# Patient Record
Sex: FEMALE | Race: WHITE | NOT HISPANIC OR LATINO | Employment: UNEMPLOYED | URBAN - NONMETROPOLITAN AREA
[De-identification: names, ages, dates, MRNs, and addresses within clinical notes are randomized per-mention and may not be internally consistent; named-entity substitution may affect disease eponyms.]

---

## 2022-11-04 ENCOUNTER — OFFICE VISIT (OUTPATIENT)
Dept: GASTROENTEROLOGY | Facility: CLINIC | Age: 57
End: 2022-11-04

## 2022-11-04 VITALS — WEIGHT: 124.4 LBS | HEIGHT: 62 IN | BODY MASS INDEX: 22.89 KG/M2

## 2022-11-04 DIAGNOSIS — Z86.010 HISTORY OF COLON POLYPS: ICD-10-CM

## 2022-11-04 DIAGNOSIS — K59.04 CHRONIC IDIOPATHIC CONSTIPATION: ICD-10-CM

## 2022-11-04 DIAGNOSIS — K62.5 BRBPR (BRIGHT RED BLOOD PER RECTUM): Primary | ICD-10-CM

## 2022-11-04 PROCEDURE — 99204 OFFICE O/P NEW MOD 45 MIN: CPT | Performed by: PHYSICIAN ASSISTANT

## 2022-11-04 RX ORDER — POLYETHYLENE GLYCOL 3350 17 G/17G
510 POWDER, FOR SOLUTION ORAL TAKE AS DIRECTED
Qty: 510 G | Refills: 0 | Status: SHIPPED | OUTPATIENT
Start: 2022-11-04 | End: 2022-11-17 | Stop reason: HOSPADM

## 2022-11-04 RX ORDER — BISACODYL 5 MG/1
20 TABLET, DELAYED RELEASE ORAL ONCE
Qty: 4 TABLET | Refills: 0 | Status: SHIPPED | OUTPATIENT
Start: 2022-11-04 | End: 2022-11-04

## 2022-11-04 RX ORDER — RISEDRONATE SODIUM 150 MG/1
TABLET, FILM COATED ORAL
COMMUNITY
Start: 2022-10-29

## 2022-11-04 NOTE — PROGRESS NOTES
Chief Complaint   Patient presents with   • Rectal Bleeding       Yamileth Wiggins is a 57 y.o. female who presents to the office today for evaluation of Rectal Bleeding      HPI  This is a new patient who presents for evaluation of rectal bleeding and constipation.     The patient reports several episodes of rather significant blood per rectum resulting in with lower abdominal cramping. There is a family history of diverticulitis in her father and has questions regarding whether this is hereditary. She denies a family history of colon cancer, though she states her father has a benign gastric tumor and renal cancer resulting in a nephrectomy. Her most recent colonoscopy was 5 years ago, which identified 2 polyps. The patient reports that she is from [place] and since coming to [place] recently, she has not been eating in a healthy manner or taking very good care of herself. Whereas in [place], she typically eats fish, vegetables, and salads, she has been eating lots of meat and junk food. Her bowel movements have been somewhat hard recently, and she notes protrusion of a known hemorrhoid.     Review of Systems   Constitutional: Negative for fever.   HENT: Negative for trouble swallowing.    Eyes: Negative.    Respiratory: Negative.    Cardiovascular: Negative.    Gastrointestinal: Positive for abdominal pain, anal bleeding and blood in stool. Negative for abdominal distention, constipation, diarrhea, nausea and vomiting.   Endocrine: Negative.    Genitourinary: Negative.    Musculoskeletal: Negative.    Skin: Negative.    Allergic/Immunologic: Negative.    Neurological: Negative.    Hematological: Negative.    Psychiatric/Behavioral: Negative.        ACTIVE PROBLEMS:   Specialty Problems    None      PAST MEDICAL HISTORY:  Past Medical History:   Diagnosis Date   • Osteoporosis        SURGICAL HISTORY:  Past Surgical History:   Procedure Laterality Date   • FOOT SURGERY         FAMILY HISTORY:  Family History   Problem  "Relation Age of Onset   • Diverticulitis Father        SOCIAL HISTORY:  Social History     Tobacco Use   • Smoking status: Never   • Smokeless tobacco: Never   Substance Use Topics   • Alcohol use: Never       CURRENT MEDICATION:    Current Outpatient Medications:   •  polyethylene glycol (MIRALAX) 17 GM/SCOOP powder, Take 510 g by mouth Take As Directed. Place 15 cap fulls of powder in 32 oz of liquid of choice at 4:00 and 10:00 PM, Disp: 510 g, Rfl: 0  •  risedronate (ACTONEL) 150 MG tablet, , Disp: , Rfl:     ALLERGIES:  Sulfa antibiotics    VISIT VITALS:  Ht 157.5 cm (62\")   Wt 56.4 kg (124 lb 6.4 oz)   BMI 22.75 kg/m²   Physical Exam  Constitutional:       General: She is not in acute distress.     Appearance: Normal appearance. She is well-developed.   HENT:      Head: Normocephalic and atraumatic.   Eyes:      Pupils: Pupils are equal, round, and reactive to light.   Cardiovascular:      Rate and Rhythm: Normal rate and regular rhythm.      Heart sounds: Normal heart sounds.   Pulmonary:      Effort: Pulmonary effort is normal. No respiratory distress.      Breath sounds: Normal breath sounds. No wheezing, rhonchi or rales.   Abdominal:      General: Abdomen is flat. Bowel sounds are normal. There is no distension.      Palpations: Abdomen is soft. There is no mass.      Tenderness: There is no abdominal tenderness. There is no guarding or rebound.      Hernia: No hernia is present.   Musculoskeletal:         General: No swelling. Normal range of motion.      Cervical back: Normal range of motion and neck supple.      Right lower leg: No edema.      Left lower leg: No edema.   Skin:     General: Skin is warm and dry.   Neurological:      Mental Status: She is alert and oriented to person, place, and time.   Psychiatric:         Attention and Perception: Attention normal.         Mood and Affect: Mood normal.         Speech: Speech normal.         Behavior: Behavior normal. Behavior is cooperative.         " Thought Content: Thought content normal.         Assessment       Diagnosis Plan   1. BRBPR (bright red blood per rectum)  Case Request    Follow Anesthesia Guidelines / Protocol    Obtain Informed Consent    Case Request    bisacodyl (DULCOLAX) 5 MG EC tablet    polyethylene glycol (MIRALAX) 17 GM/SCOOP powder      2. History of colon polyps  bisacodyl (DULCOLAX) 5 MG EC tablet    polyethylene glycol (MIRALAX) 17 GM/SCOOP powder      3. Chronic idiopathic constipation  bisacodyl (DULCOLAX) 5 MG EC tablet    polyethylene glycol (MIRALAX) 17 GM/SCOOP powder        1. BRBPR (bright red blood per rectum)    - Colonoscopy ordered. Bowel prep instructions discussed and provided.   - bisacodyl (Dulcolax) 5 mg   - polyethylene glycol (MiraLAX) 17 gm/scoop powder    2. History of colon polyps    - Colonoscopy ordered. Bowel prep instructions discussed and provided.   - bisacodyl (Dulcolax) 5 mg EC tablet  - polyethylene glycol (MiraLAX) 17 gm/scoop powder    3. Chronic idiopathic constipation    - Colonoscopy ordered. Bowel prep instructions discussed and provided.   - bisacodyl (Dulcolax) 5 mg   - polyethylene glycol (MiraLAX) 17 gm/scoop powder      Return After procedure.               This document has been electronically signed by Sophia Mccullough PA-C  November 10, 2022 11:27 EST    Part of this note may be an electronic transcription/translation of spoken language to printed text using the Dragon Dictation System.

## 2022-11-04 NOTE — H&P (VIEW-ONLY)
Chief Complaint   Patient presents with   • Rectal Bleeding       Yamileth Wiggins is a 57 y.o. female who presents to the office today for evaluation of Rectal Bleeding      HPI  This is a new patient who presents for evaluation of rectal bleeding and constipation.     The patient reports several episodes of rather significant blood per rectum resulting in with lower abdominal cramping. There is a family history of diverticulitis in her father and has questions regarding whether this is hereditary. She denies a family history of colon cancer, though she states her father has a benign gastric tumor and renal cancer resulting in a nephrectomy. Her most recent colonoscopy was 5 years ago, which identified 2 polyps. The patient reports that she is from [place] and since coming to [place] recently, she has not been eating in a healthy manner or taking very good care of herself. Whereas in [place], she typically eats fish, vegetables, and salads, she has been eating lots of meat and junk food. Her bowel movements have been somewhat hard recently, and she notes protrusion of a known hemorrhoid.     Review of Systems   Constitutional: Negative for fever.   HENT: Negative for trouble swallowing.    Eyes: Negative.    Respiratory: Negative.    Cardiovascular: Negative.    Gastrointestinal: Positive for abdominal pain, anal bleeding and blood in stool. Negative for abdominal distention, constipation, diarrhea, nausea and vomiting.   Endocrine: Negative.    Genitourinary: Negative.    Musculoskeletal: Negative.    Skin: Negative.    Allergic/Immunologic: Negative.    Neurological: Negative.    Hematological: Negative.    Psychiatric/Behavioral: Negative.        ACTIVE PROBLEMS:   Specialty Problems    None      PAST MEDICAL HISTORY:  Past Medical History:   Diagnosis Date   • Osteoporosis        SURGICAL HISTORY:  Past Surgical History:   Procedure Laterality Date   • FOOT SURGERY         FAMILY HISTORY:  Family History   Problem  "Relation Age of Onset   • Diverticulitis Father        SOCIAL HISTORY:  Social History     Tobacco Use   • Smoking status: Never   • Smokeless tobacco: Never   Substance Use Topics   • Alcohol use: Never       CURRENT MEDICATION:    Current Outpatient Medications:   •  polyethylene glycol (MIRALAX) 17 GM/SCOOP powder, Take 510 g by mouth Take As Directed. Place 15 cap fulls of powder in 32 oz of liquid of choice at 4:00 and 10:00 PM, Disp: 510 g, Rfl: 0  •  risedronate (ACTONEL) 150 MG tablet, , Disp: , Rfl:     ALLERGIES:  Sulfa antibiotics    VISIT VITALS:  Ht 157.5 cm (62\")   Wt 56.4 kg (124 lb 6.4 oz)   BMI 22.75 kg/m²   Physical Exam  Constitutional:       General: She is not in acute distress.     Appearance: Normal appearance. She is well-developed.   HENT:      Head: Normocephalic and atraumatic.   Eyes:      Pupils: Pupils are equal, round, and reactive to light.   Cardiovascular:      Rate and Rhythm: Normal rate and regular rhythm.      Heart sounds: Normal heart sounds.   Pulmonary:      Effort: Pulmonary effort is normal. No respiratory distress.      Breath sounds: Normal breath sounds. No wheezing, rhonchi or rales.   Abdominal:      General: Abdomen is flat. Bowel sounds are normal. There is no distension.      Palpations: Abdomen is soft. There is no mass.      Tenderness: There is no abdominal tenderness. There is no guarding or rebound.      Hernia: No hernia is present.   Musculoskeletal:         General: No swelling. Normal range of motion.      Cervical back: Normal range of motion and neck supple.      Right lower leg: No edema.      Left lower leg: No edema.   Skin:     General: Skin is warm and dry.   Neurological:      Mental Status: She is alert and oriented to person, place, and time.   Psychiatric:         Attention and Perception: Attention normal.         Mood and Affect: Mood normal.         Speech: Speech normal.         Behavior: Behavior normal. Behavior is cooperative.         " Thought Content: Thought content normal.         Assessment       Diagnosis Plan   1. BRBPR (bright red blood per rectum)  Case Request    Follow Anesthesia Guidelines / Protocol    Obtain Informed Consent    Case Request    bisacodyl (DULCOLAX) 5 MG EC tablet    polyethylene glycol (MIRALAX) 17 GM/SCOOP powder      2. History of colon polyps  bisacodyl (DULCOLAX) 5 MG EC tablet    polyethylene glycol (MIRALAX) 17 GM/SCOOP powder      3. Chronic idiopathic constipation  bisacodyl (DULCOLAX) 5 MG EC tablet    polyethylene glycol (MIRALAX) 17 GM/SCOOP powder        1. BRBPR (bright red blood per rectum)    - Colonoscopy ordered. Bowel prep instructions discussed and provided.   - bisacodyl (Dulcolax) 5 mg   - polyethylene glycol (MiraLAX) 17 gm/scoop powder    2. History of colon polyps    - Colonoscopy ordered. Bowel prep instructions discussed and provided.   - bisacodyl (Dulcolax) 5 mg EC tablet  - polyethylene glycol (MiraLAX) 17 gm/scoop powder    3. Chronic idiopathic constipation    - Colonoscopy ordered. Bowel prep instructions discussed and provided.   - bisacodyl (Dulcolax) 5 mg   - polyethylene glycol (MiraLAX) 17 gm/scoop powder      Return After procedure.               This document has been electronically signed by Sophia Mccullough PA-C  November 10, 2022 11:27 EST    Part of this note may be an electronic transcription/translation of spoken language to printed text using the Dragon Dictation System.

## 2022-11-09 PROBLEM — K62.5 BRBPR (BRIGHT RED BLOOD PER RECTUM): Status: ACTIVE | Noted: 2022-11-09

## 2022-11-17 ENCOUNTER — HOSPITAL ENCOUNTER (OUTPATIENT)
Facility: HOSPITAL | Age: 57
Setting detail: HOSPITAL OUTPATIENT SURGERY
Discharge: HOME | End: 2022-11-17
Attending: INTERNAL MEDICINE | Admitting: INTERNAL MEDICINE

## 2022-11-17 ENCOUNTER — ANESTHESIA (OUTPATIENT)
Dept: PERIOP | Facility: HOSPITAL | Age: 57
End: 2022-11-17

## 2022-11-17 ENCOUNTER — ANESTHESIA EVENT (OUTPATIENT)
Dept: PERIOP | Facility: HOSPITAL | Age: 57
End: 2022-11-17

## 2022-11-17 VITALS
BODY MASS INDEX: 22.82 KG/M2 | HEART RATE: 63 BPM | HEIGHT: 62 IN | OXYGEN SATURATION: 100 % | SYSTOLIC BLOOD PRESSURE: 128 MMHG | TEMPERATURE: 97.1 F | RESPIRATION RATE: 20 BRPM | DIASTOLIC BLOOD PRESSURE: 72 MMHG | WEIGHT: 124 LBS

## 2022-11-17 DIAGNOSIS — K62.5 BRBPR (BRIGHT RED BLOOD PER RECTUM): ICD-10-CM

## 2022-11-17 PROCEDURE — 45378 DIAGNOSTIC COLONOSCOPY: CPT | Performed by: INTERNAL MEDICINE

## 2022-11-17 PROCEDURE — 25010000002 MIDAZOLAM PER 1MG: Performed by: NURSE ANESTHETIST, CERTIFIED REGISTERED

## 2022-11-17 PROCEDURE — 25010000002 PROPOFOL 200 MG/20ML EMULSION: Performed by: NURSE ANESTHETIST, CERTIFIED REGISTERED

## 2022-11-17 RX ORDER — KETOROLAC TROMETHAMINE 30 MG/ML
30 INJECTION, SOLUTION INTRAMUSCULAR; INTRAVENOUS EVERY 6 HOURS PRN
Status: DISCONTINUED | OUTPATIENT
Start: 2022-11-17 | End: 2022-11-17 | Stop reason: HOSPADM

## 2022-11-17 RX ORDER — SODIUM CHLORIDE, SODIUM LACTATE, POTASSIUM CHLORIDE, CALCIUM CHLORIDE 600; 310; 30; 20 MG/100ML; MG/100ML; MG/100ML; MG/100ML
125 INJECTION, SOLUTION INTRAVENOUS ONCE
Status: COMPLETED | OUTPATIENT
Start: 2022-11-17 | End: 2022-11-17

## 2022-11-17 RX ORDER — LIDOCAINE HYDROCHLORIDE 20 MG/ML
INJECTION, SOLUTION EPIDURAL; INFILTRATION; INTRACAUDAL; PERINEURAL AS NEEDED
Status: DISCONTINUED | OUTPATIENT
Start: 2022-11-17 | End: 2022-11-17 | Stop reason: SURG

## 2022-11-17 RX ORDER — FENTANYL CITRATE 50 UG/ML
50 INJECTION, SOLUTION INTRAMUSCULAR; INTRAVENOUS
Status: DISCONTINUED | OUTPATIENT
Start: 2022-11-17 | End: 2022-11-17 | Stop reason: HOSPADM

## 2022-11-17 RX ORDER — SODIUM CHLORIDE 0.9 % (FLUSH) 0.9 %
10 SYRINGE (ML) INJECTION AS NEEDED
Status: DISCONTINUED | OUTPATIENT
Start: 2022-11-17 | End: 2022-11-17 | Stop reason: HOSPADM

## 2022-11-17 RX ORDER — SODIUM CHLORIDE 0.9 % (FLUSH) 0.9 %
10 SYRINGE (ML) INJECTION EVERY 12 HOURS SCHEDULED
Status: DISCONTINUED | OUTPATIENT
Start: 2022-11-17 | End: 2022-11-17 | Stop reason: HOSPADM

## 2022-11-17 RX ORDER — IPRATROPIUM BROMIDE AND ALBUTEROL SULFATE 2.5; .5 MG/3ML; MG/3ML
3 SOLUTION RESPIRATORY (INHALATION) ONCE AS NEEDED
Status: DISCONTINUED | OUTPATIENT
Start: 2022-11-17 | End: 2022-11-17 | Stop reason: HOSPADM

## 2022-11-17 RX ORDER — SODIUM CHLORIDE, SODIUM LACTATE, POTASSIUM CHLORIDE, CALCIUM CHLORIDE 600; 310; 30; 20 MG/100ML; MG/100ML; MG/100ML; MG/100ML
100 INJECTION, SOLUTION INTRAVENOUS ONCE AS NEEDED
Status: DISCONTINUED | OUTPATIENT
Start: 2022-11-17 | End: 2022-11-17 | Stop reason: HOSPADM

## 2022-11-17 RX ORDER — MIDAZOLAM HYDROCHLORIDE 2 MG/2ML
INJECTION, SOLUTION INTRAMUSCULAR; INTRAVENOUS AS NEEDED
Status: DISCONTINUED | OUTPATIENT
Start: 2022-11-17 | End: 2022-11-17 | Stop reason: SURG

## 2022-11-17 RX ORDER — ONDANSETRON 2 MG/ML
4 INJECTION INTRAMUSCULAR; INTRAVENOUS AS NEEDED
Status: DISCONTINUED | OUTPATIENT
Start: 2022-11-17 | End: 2022-11-17 | Stop reason: HOSPADM

## 2022-11-17 RX ORDER — MIDAZOLAM HYDROCHLORIDE 1 MG/ML
1 INJECTION INTRAMUSCULAR; INTRAVENOUS
Status: DISCONTINUED | OUTPATIENT
Start: 2022-11-17 | End: 2022-11-17 | Stop reason: HOSPADM

## 2022-11-17 RX ORDER — MEPERIDINE HYDROCHLORIDE 25 MG/ML
12.5 INJECTION INTRAMUSCULAR; INTRAVENOUS; SUBCUTANEOUS
Status: DISCONTINUED | OUTPATIENT
Start: 2022-11-17 | End: 2022-11-17 | Stop reason: HOSPADM

## 2022-11-17 RX ORDER — OXYCODONE HYDROCHLORIDE AND ACETAMINOPHEN 5; 325 MG/1; MG/1
1 TABLET ORAL ONCE AS NEEDED
Status: DISCONTINUED | OUTPATIENT
Start: 2022-11-17 | End: 2022-11-17 | Stop reason: HOSPADM

## 2022-11-17 RX ORDER — PROPOFOL 10 MG/ML
INJECTION, EMULSION INTRAVENOUS AS NEEDED
Status: DISCONTINUED | OUTPATIENT
Start: 2022-11-17 | End: 2022-11-17 | Stop reason: SURG

## 2022-11-17 RX ADMIN — LIDOCAINE HYDROCHLORIDE 60 MG: 20 INJECTION, SOLUTION EPIDURAL; INFILTRATION; INTRACAUDAL; PERINEURAL at 11:40

## 2022-11-17 RX ADMIN — PROPOFOL 50 MG: 10 INJECTION, EMULSION INTRAVENOUS at 11:56

## 2022-11-17 RX ADMIN — PROPOFOL 100 MG: 10 INJECTION, EMULSION INTRAVENOUS at 11:40

## 2022-11-17 RX ADMIN — SODIUM CHLORIDE, POTASSIUM CHLORIDE, SODIUM LACTATE AND CALCIUM CHLORIDE: 600; 310; 30; 20 INJECTION, SOLUTION INTRAVENOUS at 11:37

## 2022-11-17 RX ADMIN — MIDAZOLAM HYDROCHLORIDE 2 MG: 1 INJECTION, SOLUTION INTRAMUSCULAR; INTRAVENOUS at 11:37

## 2022-11-17 RX ADMIN — PROPOFOL 50 MG: 10 INJECTION, EMULSION INTRAVENOUS at 11:45

## 2022-11-17 RX ADMIN — PROPOFOL 50 MG: 10 INJECTION, EMULSION INTRAVENOUS at 11:51

## 2022-11-17 NOTE — ANESTHESIA PREPROCEDURE EVALUATION
Anesthesia Evaluation     Patient summary reviewed and Nursing notes reviewed   no history of anesthetic complications:  NPO Solid Status: > 8 hours  NPO Liquid Status: > 8 hours           Airway   Mallampati: I  TM distance: >3 FB  Neck ROM: full  No difficulty expected  Dental - normal exam     Pulmonary - negative pulmonary ROS and normal exam   Cardiovascular - negative cardio ROS and normal exam        Neuro/Psych- negative ROS  GI/Hepatic/Renal/Endo    (+)  GI bleeding ,     Musculoskeletal (-) negative ROS    Abdominal  - normal exam    Bowel sounds: normal.   Substance History - negative use     OB/GYN negative ob/gyn ROS         Other        ROS/Med Hx Other: Osteoporosis                  Anesthesia Plan    ASA 2     general     intravenous induction     Anesthetic plan, risks, benefits, and alternatives have been provided, discussed and informed consent has been obtained with: patient.        CODE STATUS:

## 2022-11-17 NOTE — ANESTHESIA POSTPROCEDURE EVALUATION
Patient: Yamileth Wiggins    Procedure Summary     Date: 11/17/22 Room / Location: [facility] OR [address] [place] [address] COR OR    Anesthesia Start: 1137 Anesthesia Stop: 1202    Procedure: COLONOSCOPY Diagnosis:       BRBPR (bright red blood per rectum)      (BRBPR (bright red blood per rectum) [K62.5])    Surgeons: Digna Daniel MD Provider: Brandon Brink MD    Anesthesia Type: general ASA Status: 2          Anesthesia Type: general    Vitals  Vitals Value Taken Time   /72 11/17/22 1233   Temp 97.1 °F (36.2 °C) 11/17/22 1203   Pulse 63 11/17/22 1233   Resp 20 11/17/22 1233   SpO2 100 % 11/17/22 1233           Post Anesthesia Care and Evaluation    Patient location during evaluation: PHASE II  Patient participation: complete - patient participated  Level of consciousness: awake and alert  Pain score: 1  Pain management: adequate    Airway patency: patent  Anesthetic complications: No anesthetic complications  PONV Status: controlled  Cardiovascular status: acceptable  Respiratory status: acceptable  Hydration status: acceptable

## 2022-12-06 ENCOUNTER — APPOINTMENT (OUTPATIENT)
Dept: MAMMOGRAPHY | Facility: HOSPITAL | Age: 57
End: 2022-12-06

## 2022-12-20 ENCOUNTER — HOSPITAL ENCOUNTER (OUTPATIENT)
Dept: MAMMOGRAPHY | Facility: HOSPITAL | Age: 57
Discharge: HOME | End: 2022-12-20
Admitting: INTERNAL MEDICINE

## 2022-12-20 DIAGNOSIS — Z12.31 VISIT FOR SCREENING MAMMOGRAM: ICD-10-CM

## 2022-12-20 PROCEDURE — 77067 SCR MAMMO BI INCL CAD: CPT

## 2022-12-20 PROCEDURE — 77063 BREAST TOMOSYNTHESIS BI: CPT | Performed by: RADIOLOGY

## 2022-12-20 PROCEDURE — 77063 BREAST TOMOSYNTHESIS BI: CPT

## 2022-12-20 PROCEDURE — 77067 SCR MAMMO BI INCL CAD: CPT | Performed by: RADIOLOGY

## 2023-11-27 ENCOUNTER — TRANSCRIBE ORDERS (OUTPATIENT)
Dept: ADMINISTRATIVE | Facility: HOSPITAL | Age: 58
End: 2023-11-27
Payer: COMMERCIAL

## 2023-11-27 DIAGNOSIS — Z12.31 BREAST CANCER SCREENING BY MAMMOGRAM: Primary | ICD-10-CM

## 2024-02-09 ENCOUNTER — TRANSCRIBE ORDERS (OUTPATIENT)
Dept: ADMINISTRATIVE | Facility: HOSPITAL | Age: 59
End: 2024-02-09
Payer: COMMERCIAL

## 2024-02-09 DIAGNOSIS — Z12.31 SCREENING MAMMOGRAM FOR BREAST CANCER: Primary | ICD-10-CM

## 2024-02-15 ENCOUNTER — HOSPITAL ENCOUNTER (OUTPATIENT)
Dept: MAMMOGRAPHY | Facility: HOSPITAL | Age: 59
Discharge: HOME | End: 2024-02-15
Admitting: INTERNAL MEDICINE
Payer: COMMERCIAL

## 2024-02-15 DIAGNOSIS — Z12.31 SCREENING MAMMOGRAM FOR BREAST CANCER: ICD-10-CM

## 2024-02-15 PROCEDURE — 77067 SCR MAMMO BI INCL CAD: CPT | Performed by: RADIOLOGY

## 2024-02-15 PROCEDURE — 77067 SCR MAMMO BI INCL CAD: CPT

## 2024-02-15 PROCEDURE — 77063 BREAST TOMOSYNTHESIS BI: CPT

## 2024-02-15 PROCEDURE — 77063 BREAST TOMOSYNTHESIS BI: CPT | Performed by: RADIOLOGY

## 2025-02-26 ENCOUNTER — HOSPITAL ENCOUNTER (OUTPATIENT)
Dept: MAMMOGRAPHY | Facility: HOSPITAL | Age: 60
Discharge: HOME | End: 2025-02-26
Admitting: FAMILY MEDICINE
Payer: COMMERCIAL

## 2025-02-26 ENCOUNTER — OFFICE VISIT (OUTPATIENT)
Dept: FAMILY MEDICINE CLINIC | Facility: CLINIC | Age: 60
End: 2025-02-26
Payer: COMMERCIAL

## 2025-02-26 ENCOUNTER — PATIENT ROUNDING (BHMG ONLY) (OUTPATIENT)
Dept: FAMILY MEDICINE CLINIC | Facility: CLINIC | Age: 60
End: 2025-02-26
Payer: COMMERCIAL

## 2025-02-26 VITALS
WEIGHT: 128.2 LBS | DIASTOLIC BLOOD PRESSURE: 64 MMHG | HEART RATE: 64 BPM | TEMPERATURE: 97.1 F | SYSTOLIC BLOOD PRESSURE: 116 MMHG | BODY MASS INDEX: 23.59 KG/M2 | OXYGEN SATURATION: 99 % | HEIGHT: 62 IN | RESPIRATION RATE: 16 BRPM

## 2025-02-26 DIAGNOSIS — E04.1 THYROID NODULE: ICD-10-CM

## 2025-02-26 DIAGNOSIS — Z13.6 ENCOUNTER FOR LIPID SCREENING FOR CARDIOVASCULAR DISEASE: ICD-10-CM

## 2025-02-26 DIAGNOSIS — M81.0 OSTEOPOROSIS WITHOUT CURRENT PATHOLOGICAL FRACTURE, UNSPECIFIED OSTEOPOROSIS TYPE: Primary | ICD-10-CM

## 2025-02-26 DIAGNOSIS — D64.9 ANEMIA, UNSPECIFIED TYPE: ICD-10-CM

## 2025-02-26 DIAGNOSIS — Z13.220 ENCOUNTER FOR LIPID SCREENING FOR CARDIOVASCULAR DISEASE: ICD-10-CM

## 2025-02-26 DIAGNOSIS — Z12.31 VISIT FOR SCREENING MAMMOGRAM: ICD-10-CM

## 2025-02-26 DIAGNOSIS — L30.9 DERMATITIS: ICD-10-CM

## 2025-02-26 PROCEDURE — 77063 BREAST TOMOSYNTHESIS BI: CPT

## 2025-02-26 PROCEDURE — 99204 OFFICE O/P NEW MOD 45 MIN: CPT | Performed by: FAMILY MEDICINE

## 2025-02-26 PROCEDURE — 77067 SCR MAMMO BI INCL CAD: CPT

## 2025-02-26 RX ORDER — LANOLIN ALCOHOL/MO/W.PET/CERES
1000 CREAM (GRAM) TOPICAL DAILY
COMMUNITY

## 2025-02-26 RX ORDER — MULTIVIT WITH CALCIUM,IRON,MIN
1 TABLET ORAL DAILY
COMMUNITY

## 2025-02-26 RX ORDER — RISEDRONATE SODIUM 150 MG/1
150 TABLET, FILM COATED ORAL
Qty: 3 TABLET | Refills: 1 | Status: SHIPPED | OUTPATIENT
Start: 2025-02-26

## 2025-02-26 RX ORDER — ASCORBIC ACID 500 MG
500 TABLET ORAL DAILY
COMMUNITY

## 2025-02-26 RX ORDER — CLOTRIMAZOLE AND BETAMETHASONE DIPROPIONATE 10; .64 MG/G; MG/G
1 CREAM TOPICAL 2 TIMES DAILY
Qty: 45 G | Refills: 0 | Status: SHIPPED | OUTPATIENT
Start: 2025-02-26

## 2025-02-26 NOTE — PROGRESS NOTES
February 26, 2025    Hello, may I speak with Yamileth Wiggins?    My name is   Iona    I am  with [facility] [facility]  [facility]  [address] [place] [address] [phone].    Before we get started may I verify your date of birth? 1965 yes     I am calling to officially welcome you to our practice and ask about your recent visit. Is this a good time to talk? Yes     Tell me about your visit with us. What things went well?  the visit went great,really like this office       We're always looking for ways to make our patients' experiences even better. Do you have recommendations on ways we may improve?  no     Overall were you satisfied with your first visit to our practice? Yes        I appreciate you taking the time to speak with me today. Is there anything else I can do for you?   No     Thank you, and have a great day.

## 2025-03-07 ENCOUNTER — LAB (OUTPATIENT)
Dept: FAMILY MEDICINE CLINIC | Facility: CLINIC | Age: 60
End: 2025-03-07
Payer: COMMERCIAL

## 2025-03-07 DIAGNOSIS — D64.9 ANEMIA, UNSPECIFIED TYPE: ICD-10-CM

## 2025-03-07 DIAGNOSIS — Z13.220 ENCOUNTER FOR LIPID SCREENING FOR CARDIOVASCULAR DISEASE: ICD-10-CM

## 2025-03-07 DIAGNOSIS — M81.0 OSTEOPOROSIS WITHOUT CURRENT PATHOLOGICAL FRACTURE, UNSPECIFIED OSTEOPOROSIS TYPE: ICD-10-CM

## 2025-03-07 DIAGNOSIS — E04.1 THYROID NODULE: ICD-10-CM

## 2025-03-07 DIAGNOSIS — Z13.6 ENCOUNTER FOR LIPID SCREENING FOR CARDIOVASCULAR DISEASE: ICD-10-CM

## 2025-03-07 LAB
25(OH)D3 SERPL-MCNC: 31.4 NG/ML (ref 30–100)
ALBUMIN SERPL-MCNC: 4.2 G/DL (ref 3.5–5.2)
ALBUMIN/GLOB SERPL: 1.3 G/DL
ALP SERPL-CCNC: 86 U/L (ref 39–117)
ALT SERPL W P-5'-P-CCNC: 24 U/L (ref 1–33)
ANION GAP SERPL CALCULATED.3IONS-SCNC: 11 MMOL/L (ref 5–15)
AST SERPL-CCNC: 24 U/L (ref 1–32)
BASOPHILS # BLD AUTO: 0.03 10*3/MM3 (ref 0–0.2)
BASOPHILS NFR BLD AUTO: 0.6 % (ref 0–1.5)
BILIRUB SERPL-MCNC: 0.4 MG/DL (ref 0–1.2)
BUN SERPL-MCNC: 9 MG/DL (ref 8–23)
BUN/CREAT SERPL: 10.3 (ref 7–25)
CALCIUM SPEC-SCNC: 9.5 MG/DL (ref 8.6–10.5)
CHLORIDE SERPL-SCNC: 103 MMOL/L (ref 98–107)
CHOLEST SERPL-MCNC: 224 MG/DL (ref 0–200)
CO2 SERPL-SCNC: 26 MMOL/L (ref 22–29)
CREAT SERPL-MCNC: 0.87 MG/DL (ref 0.57–1)
DEPRECATED RDW RBC AUTO: 40.3 FL (ref 37–54)
EGFRCR SERPLBLD CKD-EPI 2021: 75.4 ML/MIN/1.73
EOSINOPHIL # BLD AUTO: 0.08 10*3/MM3 (ref 0–0.4)
EOSINOPHIL NFR BLD AUTO: 1.6 % (ref 0.3–6.2)
ERYTHROCYTE [DISTWIDTH] IN BLOOD BY AUTOMATED COUNT: 12 % (ref 12.3–15.4)
FERRITIN SERPL-MCNC: 78.2 NG/ML (ref 13–150)
GLOBULIN UR ELPH-MCNC: 3.3 GM/DL
GLUCOSE SERPL-MCNC: 102 MG/DL (ref 65–99)
HCT VFR BLD AUTO: 42 % (ref 34–46.6)
HDLC SERPL-MCNC: 82 MG/DL (ref 40–60)
HGB BLD-MCNC: 14.1 G/DL (ref 12–15.9)
IMM GRANULOCYTES # BLD AUTO: 0 10*3/MM3 (ref 0–0.05)
IMM GRANULOCYTES NFR BLD AUTO: 0 % (ref 0–0.5)
IRON 24H UR-MRATE: 56 MCG/DL (ref 37–145)
IRON SATN MFR SERPL: 17 % (ref 20–50)
LDLC SERPL CALC-MCNC: 126 MG/DL (ref 0–100)
LDLC/HDLC SERPL: 1.51 {RATIO}
LYMPHOCYTES # BLD AUTO: 1.89 10*3/MM3 (ref 0.7–3.1)
LYMPHOCYTES NFR BLD AUTO: 37.8 % (ref 19.6–45.3)
MCH RBC QN AUTO: 30.6 PG (ref 26.6–33)
MCHC RBC AUTO-ENTMCNC: 33.6 G/DL (ref 31.5–35.7)
MCV RBC AUTO: 91.1 FL (ref 79–97)
MONOCYTES # BLD AUTO: 0.66 10*3/MM3 (ref 0.1–0.9)
MONOCYTES NFR BLD AUTO: 13.2 % (ref 5–12)
NEUTROPHILS NFR BLD AUTO: 2.34 10*3/MM3 (ref 1.7–7)
NEUTROPHILS NFR BLD AUTO: 46.8 % (ref 42.7–76)
NRBC BLD AUTO-RTO: 0 /100 WBC (ref 0–0.2)
PLATELET # BLD AUTO: 257 10*3/MM3 (ref 140–450)
PMV BLD AUTO: 10.5 FL (ref 6–12)
POTASSIUM SERPL-SCNC: 4.1 MMOL/L (ref 3.5–5.2)
PROT SERPL-MCNC: 7.5 G/DL (ref 6–8.5)
RBC # BLD AUTO: 4.61 10*6/MM3 (ref 3.77–5.28)
SODIUM SERPL-SCNC: 140 MMOL/L (ref 136–145)
T4 FREE SERPL-MCNC: 1.04 NG/DL (ref 0.92–1.68)
TIBC SERPL-MCNC: 335 MCG/DL (ref 298–536)
TRANSFERRIN SERPL-MCNC: 225 MG/DL (ref 200–360)
TRIGL SERPL-MCNC: 91 MG/DL (ref 0–150)
TSH SERPL DL<=0.05 MIU/L-ACNC: 2.29 UIU/ML (ref 0.27–4.2)
VLDLC SERPL-MCNC: 16 MG/DL (ref 5–40)
WBC NRBC COR # BLD AUTO: 5 10*3/MM3 (ref 3.4–10.8)

## 2025-03-07 PROCEDURE — 84439 ASSAY OF FREE THYROXINE: CPT | Performed by: FAMILY MEDICINE

## 2025-03-07 PROCEDURE — 82728 ASSAY OF FERRITIN: CPT | Performed by: FAMILY MEDICINE

## 2025-03-07 PROCEDURE — 36415 COLL VENOUS BLD VENIPUNCTURE: CPT

## 2025-03-07 PROCEDURE — 83540 ASSAY OF IRON: CPT | Performed by: FAMILY MEDICINE

## 2025-03-07 PROCEDURE — 80061 LIPID PANEL: CPT | Performed by: FAMILY MEDICINE

## 2025-03-07 PROCEDURE — 82306 VITAMIN D 25 HYDROXY: CPT | Performed by: FAMILY MEDICINE

## 2025-03-07 PROCEDURE — 80050 GENERAL HEALTH PANEL: CPT | Performed by: FAMILY MEDICINE

## 2025-03-07 PROCEDURE — 84466 ASSAY OF TRANSFERRIN: CPT | Performed by: FAMILY MEDICINE

## 2025-03-10 ENCOUNTER — HOSPITAL ENCOUNTER (OUTPATIENT)
Dept: ULTRASOUND IMAGING | Facility: HOSPITAL | Age: 60
Discharge: HOME | End: 2025-03-10
Admitting: FAMILY MEDICINE
Payer: COMMERCIAL

## 2025-03-10 PROCEDURE — 76536 US EXAM OF HEAD AND NECK: CPT

## 2025-03-17 NOTE — PROGRESS NOTES
"Yamileth Rosalie     VITALS: Blood pressure 116/64, pulse 64, temperature 97.1 °F (36.2 °C), temperature source Temporal, resp. rate 16, height 157.5 cm (62\"), weight 58.2 kg (128 lb 3.2 oz), SpO2 99%.    Subjective  Chief Complaint  Establish Care, Osteoporosis, Thyroid Problem, Endometriosis, and Dense Breast Tissue    Subjective          History of Present Illness:    History of Present Illness  The patient is a 60-year-old female with medical conditions significant for osteoporosis and thyroid nodules who presents to the clinic for establishment of care.    She has recently relocated from [place] to [place] and is seeking a new primary care physician. She has been diagnosed with thyroid nodules, which have remained stable since her last ultrasound in 2023. She is not currently on any thyroid medication. She has no family history of thyroid cancer or autoimmune diseases such as Graves' disease, Hashimoto's thyroiditis, rheumatoid arthritis, lupus, Sjogren's syndrome, scleroderma, or Crohn's disease.    She underwent a mammogram earlier today. Her last Pap smear was conducted in 2023, with normal results. She has a history of one abnormal Pap smear, which was followed by a repeat test. She did not have her annual physical examination in 2024 due to her relocation from [place]. She has previously undergone a colonoscopy and is aware of the need to increase her dietary fiber intake. She has requested a copy of her immunization records. She received her shingles vaccine at [facility] and her pneumonia vaccine at [facility]. She contracted influenza this year, which was mild in severity.    She has a persistent cough that developed post-influenza infection, which also affects her .    She has a severe pruritic rash that necessitates the application of a dermatitis medication, which unfortunately has not provided relief.    She has a past medical history of endometriosis, which has resulted in infertility. She has undergone " several laparoscopic procedures. She was previously informed that her endometriosis would resolve post-menopause, but recent information suggests that the condition may persist due to ongoing bodily changes. She reports experiencing vaginal dryness.    She had a bone density scan last year but has not yet discussed the results with her primary care physician.    FAMILY HISTORY  She reports no family history of thyroid cancer, Graves' disease, Hashimoto's disease, rheumatoid arthritis, lupus, Sjogren's syndrome, scleroderma, or Crohn's disease. Her family history includes breast cancer, kidney cancer, high cholesterol, and high blood pressure. Her father had kidney cancer and prostate cancer. Her mother had high cholesterol, hypertension, diabetes, and  of a heart attack. All her brothers and sisters except one have high cholesterol.    IMMUNIZATIONS  She has received her shingles vaccine and pneumonia vaccine. She received influenza vaccine this year.    No complaints regarding medications.     The following portions of the patient's history were reviewed and updated as appropriate: allergies, current medications, past family history, past medical history, past social history, past surgical history and problem list.    Past Medical History  Past Medical History:   Diagnosis Date    Anemia     Disease of thyroid gland     NODULES    Endometriosis     H/O bladder infections     Osteoporosis        Surgical History  Past Surgical History:   Procedure Laterality Date    COLONOSCOPY      COLONOSCOPY N/A 2022    Procedure: COLONOSCOPY;  Surgeon: Digna Daniel MD;  Location: [facility];  Service: Gastroenterology;  Laterality: N/A;    DIAGNOSTIC LAPAROSCOPY      FOOT SURGERY Right     Vlaadez's neuroma , ,        Family History  Family History   Problem Relation Age of Onset    Diabetes Mother     Hyperlipidemia Mother     Hypertension Mother     Kidney disease Mother     Dementia Mother   "   Coronary artery disease Mother         MI (cause of death)    Prostate cancer Father         PROSTATE ISSUES    Diverticulitis Father     Anemia Father     Kidney cancer Father     Kidney cancer Sister         KIDNEY    Kidney disease Sister     Asthma Sister     Breast cancer Sister 64    Breast cancer Sister         BREAST    Hypertension Sister     Hypertension Sister     Breast cancer Sister         BREAST    Hypertension Brother     Gout Brother        Social History  Social History     Socioeconomic History    Marital status:    Tobacco Use    Smoking status: Never    Smokeless tobacco: Never   Vaping Use    Vaping status: Never Used   Substance and Sexual Activity    Alcohol use: Never    Drug use: Never    Sexual activity: Defer       Objective   Vital Signs:   /64 (BP Location: Right arm, Patient Position: Sitting, Cuff Size: Adult)   Pulse 64   Temp 97.1 °F (36.2 °C) (Temporal)   Resp 16   Ht 157.5 cm (62\")   Wt 58.2 kg (128 lb 3.2 oz)   SpO2 99%   BMI 23.45 kg/m²       Physical Exam     Physical Exam      Gen: Patient in NAD. Pleasant and answers appropriately. A&Ox3.    Skin: Warm and dry with normal turgor. No purpura or unusual pigmentation noted. Hair is normal in appearance and distribution.    HEENT: NC/AT. No lesions noted. Conjunctiva clear, sclera nonicteric. PERRL. EOMI without nystagmus or strabismus. Fundi appear benign. No hemorrhages or exudates of eyes. Auditory canals are patent bilaterally without lesions. TMs intact,  nonerythematous, nonbulging without lesions. Nasal mucosa pink, nonerythematous, and nonedematous. Frontal and maxillary sinuses are nontender. O/P nonerythematous and moist without exudate.    Neck: Supple without lymph nodes palpated. FROM. No carotid bruits appreciated bilaterally.    Lungs: CTA B/L without rales, rhonchi, crackles, or wheezes.    Heart: RRR. S1 and S2 normal. No S3 or S4. No MRGT.    Abd: Soft, nontender,nondistended. (+)BSx4 " quadrants.     Extrem: No CCE. Radial pulses 2+/4 and equal B/L. FROMx4. No bone, joint, or muscle tenderness noted.    Neuro: No focal motor/sensory deficits.    Procedures    Result Review :   The following data was reviewed by: Rosa Pierre MD on 02/26/2025:       Results  Imaging  Thyroid ultrasound from June 2023 showed six nodules.           Assessment and Plan      Yamileth Wiggins is a 60 y.o. here for establishment of care.    Diagnoses and all orders for this visit:    1. Osteoporosis without current pathological fracture, unspecified osteoporosis type (Primary)  -     risedronate (ACTONEL) 150 MG tablet; Take 1 tablet by mouth Every 30 (Thirty) Days.  Dispense: 3 tablet; Refill: 1  -     Vitamin D,25-Hydroxy; Future    2. Thyroid nodule  -     US Thyroid  -     CBC Auto Differential; Future  -     Comprehensive Metabolic Panel; Future  -     T4, Free; Future  -     TSH; Future    3. Encounter for lipid screening for cardiovascular disease  -     Lipid Panel; Future    4. Anemia, unspecified type  -     Ferritin; Future  -     Iron Profile; Future    5. Dermatitis  -     clotrimazole-betamethasone (LOTRISONE) 1-0.05 % cream; Apply 1 Application topically to the appropriate area as directed 2 (Two) Times a Day.  Dispense: 45 g; Refill: 0    Other orders  -     LABS SCANNED        Assessment & Plan  1. Thyroid nodules.  The patient has a history of multiple thyroid nodules, with the last ultrasound performed in June 2023 showing six nodules. A thyroid ultrasound will be ordered to monitor the nodules. Thyroid function tests, including TSH and T4, will be conducted every three months.    2. Osteoporosis.  The patient has a history of osteoporosis and is currently on medication. She mentioned having a bone density test last year but has not reviewed the results with her previous doctor. The results of the bone density test will be obtained from the imaging center in [place].    3. Endometriosis.  The patient  has a history of endometriosis and expressed concerns about its persistence post-menopause. She was informed that endometriosis is hormone-driven and should resolve post-menopause, but scar tissue may still cause issues. She was advised to report any abdominal pain or abnormal bleeding.    4. Rash.  The patient reported a persistent rash that itches severely. A stronger dermatitis medication will be prescribed to manage the symptoms.    5. Cough.  The patient has a lingering cough following a recent flu infection. She was advised to stay hydrated and gargle with salt water. A physical examination will be conducted to rule out pneumonia.    6. Health Maintenance.  The patient had a mammogram done this morning and was advised to continue annual mammograms due to her family history of breast cancer. A Pap smear will be scheduled before she turns 63, as her last one was done in 2023 and was normal. She was also advised to get a colonoscopy in March 2026. Fasting labs, including cholesterol levels, will be ordered. She was informed that she is up to date on her shingles and pneumonia vaccines but was advised to get the flu vaccine annually.    PROCEDURE  The patient has previously undergone several laparoscopic procedures for endometriosis. She has also undergone a colonoscopy in the past.      BMI is within normal parameters. No other follow-up for BMI required.          Patient or patient representative verbalized consent for the use of Ambient Listening during the visit with  Rosa Pierre MD for chart documentation. 3/16/2025  23:23 EDT        Follow Up   Return in about 3 months (around 5/26/2025), or (ROR Dexa scan - [facility] [phone]) LABS.  Findings and plans discussed with patient who verbalizes understanding and agreement. Will followup with patient once results are in. Patient was given instructions and counseling regarding her condition or for health maintenance advice. Please see specific  information pulled into the AVS if appropriate.       Rosa Pierre MD

## 2025-03-27 ENCOUNTER — OFFICE VISIT (OUTPATIENT)
Dept: FAMILY MEDICINE CLINIC | Facility: CLINIC | Age: 60
End: 2025-03-27
Payer: COMMERCIAL

## 2025-03-27 VITALS
DIASTOLIC BLOOD PRESSURE: 70 MMHG | SYSTOLIC BLOOD PRESSURE: 112 MMHG | HEART RATE: 66 BPM | BODY MASS INDEX: 24.03 KG/M2 | TEMPERATURE: 97.3 F | WEIGHT: 130.6 LBS | OXYGEN SATURATION: 99 % | HEIGHT: 62 IN

## 2025-03-27 DIAGNOSIS — E04.1 THYROID NODULE: Primary | ICD-10-CM

## 2025-03-27 PROCEDURE — 99214 OFFICE O/P EST MOD 30 MIN: CPT | Performed by: FAMILY MEDICINE

## 2025-03-27 NOTE — PROGRESS NOTES
"The 10-year ASCVD risk score (Tung MCBRIDE, et al., 2019) is: 2.7%    Values used to calculate the score:      Age: 60 years      Sex: Female      Is Non- : No      Diabetic: No      Tobacco smoker: No      Systolic Blood Pressure: 112 mmHg      Is BP treated: No      HDL Cholesterol: 82 mg/dL      Total Cholesterol: 224 mg/dL    Iris Rosalie     VITALS: Blood pressure 112/70, pulse 66, temperature 97.3 °F (36.3 °C), temperature source Temporal, height 157.5 cm (62\"), weight 59.2 kg (130 lb 9.6 oz), SpO2 99%.    Subjective  Chief Complaint  Abnormal Lab    Subjective          History of Present Illness:    History of Present Illness  The patient is a 60-year-old female with medical conditions significant for osteoporosis and a thyroid nodule, presenting to the clinic for a medical follow-up.    She has been experiencing persistent hypercholesterolemia, which she attributes to genetic factors. Despite maintaining a healthy diet, her cholesterol levels remain elevated. She has expressed interest in pharmacological intervention for her cholesterol management.    She has a history of thyroid nodules, for which she has previously consulted an endocrinologist in [place]. She has not been prescribed any thyroid medication to date.    She reports feeling fatigued, which she attributes to her late bedtime and early morning routine due to her responsibilities towards her two puppies. She compensates for this by taking afternoon naps.    She has a history of an abnormal Pap smear in her early 40s, following a miscarriage. She also reports having dense breasts and has been advised by her physicians to undergo an ultrasound. She has a family history of breast cancer, with her aunt having undergone a bilateral mastectomy at a young age and her sister recently diagnosed with breast cancer.    She has been experiencing intermittent skin issues, characterized by redness and itching. She has been using a cream " prescribed by her dermatologist, which she believes is not a steroid. She plans to bring the documentation from her dermatologist during her next visit.    FAMILY HISTORY  Her aunt lost both of her breasts due to breast cancer at a young age.  Her sister had breast cancer.    No complaints regarding medications.     The following portions of the patient's history were reviewed and updated as appropriate: allergies, current medications, past family history, past medical history, past social history, past surgical history and problem list.    Past Medical History  Past Medical History:   Diagnosis Date    Anemia     Disease of thyroid gland     NODULES    Endometriosis     H/O bladder infections     Osteoporosis        Surgical History  Past Surgical History:   Procedure Laterality Date    COLONOSCOPY      COLONOSCOPY N/A 11/17/2022    Procedure: COLONOSCOPY;  Surgeon: Digna Daniel MD;  Location: [facility];  Service: Gastroenterology;  Laterality: N/A;    DIAGNOSTIC LAPAROSCOPY      FOOT SURGERY Right     Valadez's neuroma 2022, 2023, 2024       Family History  Family History   Problem Relation Age of Onset    Diabetes Mother     Hyperlipidemia Mother     Hypertension Mother     Kidney disease Mother     Dementia Mother     Coronary artery disease Mother         MI (cause of death)    Prostate cancer Father         PROSTATE ISSUES    Diverticulitis Father     Anemia Father     Kidney cancer Father     Kidney cancer Sister         KIDNEY    Kidney disease Sister     Asthma Sister     Breast cancer Sister 64    Breast cancer Sister         BREAST    Hypertension Sister     Hypertension Sister     Breast cancer Sister         BREAST    Hypertension Brother     Gout Brother        Social History  Social History     Socioeconomic History    Marital status:    Tobacco Use    Smoking status: Never    Smokeless tobacco: Never   Vaping Use    Vaping status: Never Used   Substance and Sexual Activity     "Alcohol use: Never    Drug use: Never    Sexual activity: Defer       Objective   Vital Signs:   /70 (BP Location: Right arm, Patient Position: Sitting)   Pulse 66   Temp 97.3 °F (36.3 °C) (Temporal)   Ht 157.5 cm (62\")   Wt 59.2 kg (130 lb 9.6 oz)   SpO2 99%   BMI 23.89 kg/m²       Physical Exam     Physical Exam  Lungs were auscultated.    Gen: Patient in NAD. Pleasant and answers appropriately. A&Ox3.    Skin: Warm and dry with normal turgor. No purpura, rashes, or unusual pigmentation noted. Hair is normal in appearance and distribution.    HEENT: NC/AT. No lesions noted. Conjunctiva clear, sclera nonicteric. PERRL. EOMI without nystagmus or strabismus. Fundi appear benign. No hemorrhages or exudates of eyes. Auditory canals are patent bilaterally without lesions. TMs intact,  nonerythematous, nonbulging without lesions. Nasal mucosa pink, nonerythematous, and nonedematous. Frontal and maxillary sinuses are nontender. O/P nonerythematous and moist without exudate.    Neck: Supple without lymph nodes palpated. FROM. No carotid bruits appreciated bilaterally.    Lungs: CTA B/L without rales, rhonchi, crackles, or wheezes.    Heart: RRR. S1 and S2 normal. No S3 or S4. No MRGT.    Abd: Soft, nontender,nondistended. (+)BSx4 quadrants.     Extrem: No CCE. Radial pulses 2+/4 and equal B/L. FROMx4. No bone, joint, or muscle tenderness noted.    Neuro: No focal motor/sensory deficits.    Procedures    Result Review :   The following data was reviewed by: Rosa Pierre MD on 03/27/2025:       Results  Laboratory Studies  CBC, liver, kidneys, electrolytes, iron levels, thyroid function, and vitamin D levels are all within normal range. LDL cholesterol is 126. Glucose level is normal.           Assessment and Plan      Yamileth Wiggins is a 60 y.o. here for medical followup.    Assessment & Plan  1. Hypercholesterolemia.  Her LDL cholesterol level is 126, which is above the desired level of less than 100. " However, her cardiac risk score is 2.7%, which is considered low risk. She was advised to continue her current dietary habits and avoid processed foods. No medication was prescribed at this time. Her cholesterol levels will be monitored annually.    2. Thyroid nodules.  She has a history of thyroid nodules, and a biopsy was performed in 2021. The pathology results from the biopsy are not currently available. A thyroid uptake scan will be ordered to assess the activity of the nodules. If the nodules are active, further evaluation will be necessary. She will sign a release form to obtain the pathology results from [place] for comparison.    3. Fatigue.  She reports feeling tired, which may be related to her sleep patterns as she goes to bed late and wakes up early due to her puppies. She takes naps in the afternoon. No immediate intervention was recommended.    4. Health Maintenance.  She was advised to continue weight-bearing exercises for bone health. A Pap smear will be scheduled during her next visit in 3 months. She was also advised to schedule an appointment with a gynecologist for a comprehensive evaluation. Given her family history of breast cancer, a referral to [facility] for a breast ultrasound or MRI will be made next year.    5. Skin issues.  She reports intermittent skin issues characterized by redness and itching. She was advised to take a picture of the affected area during the next occurrence for further evaluation. She will bring her dermatologist's documents to the next visit.    Follow-up  The patient will follow up in 3 months.      BMI is within normal parameters. No other follow-up for BMI required.          Patient or patient representative verbalized consent for the use of Ambient Listening during the visit with  Rosa Pierre MD for chart documentation. 4/13/2025  23:22 EDT        Follow Up   Return in about 3 months (around 6/27/2025), or ([facility] for Ask if she wants to make the 3 month WWE or if she wants to do another appt for the WWE. Thanks..  Findings and plans discussed with patient who verbalizes understanding and agreement. Will followup with patient once results are in. Patient was given instructions and counseling regarding her condition or for health maintenance advice. Please see specific information pulled into the AVS if appropriate.       Rosa Pierre MD

## 2025-04-10 ENCOUNTER — HOSPITAL ENCOUNTER (OUTPATIENT)
Dept: NUCLEAR MEDICINE | Facility: HOSPITAL | Age: 60
Discharge: HOME | End: 2025-04-10
Payer: COMMERCIAL

## 2025-04-10 PROCEDURE — 78014 THYROID IMAGING W/BLOOD FLOW: CPT

## 2025-04-10 PROCEDURE — 34310000005 SODIUM IODIDE 7.4 MBQ CAPSULE: Performed by: FAMILY MEDICINE

## 2025-04-10 PROCEDURE — A9516 IODINE I-123 SOD IODIDE MIC: HCPCS | Performed by: FAMILY MEDICINE

## 2025-04-10 RX ORDER — SODIUM IODIDE I 123 200 UCI/1
1 CAPSULE, GELATIN COATED ORAL
Status: COMPLETED | OUTPATIENT
Start: 2025-04-10 | End: 2025-04-10

## 2025-04-10 RX ADMIN — Medication 1 CAPSULE: at 09:30

## 2025-04-11 ENCOUNTER — HOSPITAL ENCOUNTER (OUTPATIENT)
Dept: NUCLEAR MEDICINE | Facility: HOSPITAL | Age: 60
Discharge: HOME | End: 2025-04-11
Payer: COMMERCIAL

## 2025-04-30 ENCOUNTER — TELEPHONE (OUTPATIENT)
Dept: FAMILY MEDICINE CLINIC | Facility: CLINIC | Age: 60
End: 2025-04-30
Payer: COMMERCIAL

## 2025-04-30 NOTE — TELEPHONE ENCOUNTER
Is she calling about the ultrasound or the uptake scan? So the ultrasound shows a nodule, but we did the uptake scan to see how active the nodule is - it is in normal range so we just watch and wait. Will rescan the thyroid in a year.

## 2025-04-30 NOTE — TELEPHONE ENCOUNTER
Caller: Yamileth Wiggins    Relationship: Self    Best call back number: [phone]     Caller requesting test results: IRIS    What test was performed: US THYROID    When was the test performed: 02/26/25    Where was the test performed: [facility]    Additional notes: PATIENT IS CALLING TO DISCUSS THE RESULTS FOR HER ULTRASOUND TAKEN ON 02/26/25.    PLEASE CALL PATIENT ASAP TO DISCUSS THE RESULTS

## 2025-05-27 ENCOUNTER — TELEPHONE (OUTPATIENT)
Dept: FAMILY MEDICINE CLINIC | Facility: CLINIC | Age: 60
End: 2025-05-27
Payer: COMMERCIAL

## 2025-05-27 NOTE — TELEPHONE ENCOUNTER
Patient did not like the explanation that was provided to her by Yasmin Walker. She then spoke with Mary and Mary explained our workflow. Patient said that the information that was given to her did not match up with what we were telling her. Mary advised that she would get me to speak with her.   I got on the phone with the patient and the patient told me that she would come in person or for me to leave a message for Dr. Pierre because she was only going to speak with Dr. Pierre regarding this. She stated she did not even think it was legal for us to do a drug screen on her and she planned to call [employer]. I offered to review our office policy, workflow, and [facility] billing. Patient stated she didn't have time for that and hung up.

## 2025-05-27 NOTE — TELEPHONE ENCOUNTER
Caller: Yamileth Wiggins    Relationship: Self    Best call back number: [phone]     What is the best time to reach you: ANYTIME    Who are you requesting to speak with (clinical staff, provider,  specific staff member): CLINICAL    What was the call regarding: PATIENT STATES SHE WAS TOLD THAT IF SHE RECEIVED A BILL FOR THE DRUG SCREENING, SHE IS TO CALL AND SPEAK TO DR. HERRERA'S NURSE. PLEASE CALL BACK TO ADVISE

## 2025-06-03 ENCOUNTER — PATIENT MESSAGE (OUTPATIENT)
Dept: FAMILY MEDICINE CLINIC | Facility: CLINIC | Age: 60
End: 2025-06-03
Payer: COMMERCIAL

## 2025-06-30 ENCOUNTER — OFFICE VISIT (OUTPATIENT)
Dept: FAMILY MEDICINE CLINIC | Facility: CLINIC | Age: 60
End: 2025-06-30
Payer: COMMERCIAL

## 2025-06-30 ENCOUNTER — TELEPHONE (OUTPATIENT)
Dept: FAMILY MEDICINE CLINIC | Facility: CLINIC | Age: 60
End: 2025-06-30

## 2025-06-30 VITALS
TEMPERATURE: 96.6 F | HEIGHT: 62 IN | OXYGEN SATURATION: 97 % | HEART RATE: 68 BPM | DIASTOLIC BLOOD PRESSURE: 78 MMHG | BODY MASS INDEX: 23.22 KG/M2 | SYSTOLIC BLOOD PRESSURE: 120 MMHG | WEIGHT: 126.2 LBS

## 2025-06-30 DIAGNOSIS — L30.9 DERMATITIS: ICD-10-CM

## 2025-06-30 DIAGNOSIS — J30.2 SEASONAL ALLERGIC RHINITIS, UNSPECIFIED TRIGGER: ICD-10-CM

## 2025-06-30 DIAGNOSIS — Z01.419 ENCOUNTER FOR WELL WOMAN EXAM WITH ROUTINE GYNECOLOGICAL EXAM: Primary | ICD-10-CM

## 2025-06-30 DIAGNOSIS — M81.0 OSTEOPOROSIS WITHOUT CURRENT PATHOLOGICAL FRACTURE, UNSPECIFIED OSTEOPOROSIS TYPE: ICD-10-CM

## 2025-06-30 DIAGNOSIS — E04.1 THYROID NODULE: ICD-10-CM

## 2025-06-30 PROCEDURE — 99396 PREV VISIT EST AGE 40-64: CPT | Performed by: FAMILY MEDICINE

## 2025-06-30 RX ORDER — PREDNISOLONE ACETATE 10 MG/ML
SUSPENSION/ DROPS OPHTHALMIC
COMMUNITY
Start: 2025-04-25

## 2025-06-30 RX ORDER — FERROUS SULFATE 325(65) MG
325 TABLET ORAL
COMMUNITY

## 2025-06-30 RX ORDER — RISEDRONATE SODIUM 150 MG/1
150 TABLET, FILM COATED ORAL
Qty: 3 TABLET | Refills: 1 | Status: SHIPPED | OUTPATIENT
Start: 2025-06-30

## 2025-06-30 RX ORDER — TACROLIMUS 1 MG/G
1 OINTMENT TOPICAL 2 TIMES DAILY
Qty: 100 G | Refills: 0 | Status: SHIPPED | OUTPATIENT
Start: 2025-06-30

## 2025-06-30 RX ORDER — FLUTICASONE PROPIONATE 50 MCG
2 SPRAY, SUSPENSION (ML) NASAL DAILY
Qty: 16 G | Refills: 5 | Status: SHIPPED | OUTPATIENT
Start: 2025-06-30

## 2025-06-30 NOTE — TELEPHONE ENCOUNTER
----- Message from Rosa Pierre sent at 6/30/2025 12:15 PM EDT -----  Can you call [facility] [phone] and see what kind of vaginal cream she used to get? I'm thinking either a steroid cream or premarin or maybe even an antifungal? Thanks.

## 2025-06-30 NOTE — PROGRESS NOTES
" Vitals:/78 (BP Location: Right arm, Patient Position: Sitting, Cuff Size: Adult)   Pulse 68   Temp 96.6 °F (35.9 °C) (Temporal)   Ht 157.5 cm (62\")   Wt 57.2 kg (126 lb 3.2 oz)   SpO2 97%   BMI 23.08 kg/m²       Subjective     Chief Complaint: Well Woman's Exam    History of Present Illness:  HPI    Patient is a 60 y.o.  female with medical conditions significant for osteoporosis and thyroid nodule who presents to clinic secondary to her well woman's exam.    Patient has no acute Well woman concerns. Patient has dyspareunia.     Patient's last pelvic exam/pap smear was 2023. She has had an abnormal exam in [facility]; it was repeated and it was normal the second time around. She has not had any STDs. She is currently not sexually active.  She feels safe in her relationship. Patient is not on any birth control. Patient's LMP was at age 48, and she used to get her period every other month. Patient states that her periods lasted approximately 7 days with clotting and cramping.  Patient first had her menses at 11.  Patient has gone through menopause. Patient is .  She does have a history of endometriosis.  Patient does occasional breast exams; she has not noticed any breast changes. Benign mammogram history. Last one was 2025.  Family history of breast cancer in sister. Paternal aunt with breast cancer.  No other reproductive cancers.    History of Present Illness    She has been experiencing persistent itching, which she managed with a cream that resulted in skin peeling. The itching has since subsided, leaving a small white spot. She has been diagnosed with perioral dermatitis affecting her nose and eyes by a dermatologist in [facility], who prescribed a cream. However, she finds the cream too expensive in the [place] and prefers an alternative treatment.    She has been struggling with pollen allergies, which have caused her to experience cold-like symptoms throughout the day. Her optometrist, " Jesica identified pollen in her eyes and prescribed eye drops. She is currently reducing her steroid dosage to once daily. Her symptoms are so severe that she is unable to go outside.    Her last Pap smear was conducted 2 years ago due to an abnormal result, but subsequent tests have been normal. She reports no unusual symptoms such as bleeding, odors, or discharge. She experiences painful intercourse.  She has a history of taking MetroGel.  She has no history of STDs and feels safe in her relationships. Her last menstrual period was at age 48. Prior to menopause, she had irregular periods every other month, lasting 7 days with clotting and cramping. She was put on birth control pills to regulate her cycle. She had her first period at age 11 and has had 2 miscarriages. She suspects she may have endometriosis.    FAMILY HISTORY  Her sister and paternal aunt have a history of breast cancer.    Previous Medical History:  Past Medical History:   Diagnosis Date    Allergic     Sulfa alergies    Anemia     Disease of thyroid gland     NODULES    Endometriosis     H/O bladder infections     Osteoporosis        Previous Surgical History:  Past Surgical History:   Procedure Laterality Date    COLONOSCOPY      COLONOSCOPY N/A 11/17/2022    Procedure: COLONOSCOPY;  Surgeon: Digna Daniel MD;  [facility];  Service: Gastroenterology;  Laterality: N/A;    DIAGNOSTIC LAPAROSCOPY      EYE SURGERY      Retina lazer    FOOT SURGERY Right     Valadez's neuroma 2022, 2023, 2024       Social History:  Social History     Socioeconomic History    Marital status:    Tobacco Use    Smoking status: Never    Smokeless tobacco: Never   Vaping Use    Vaping status: Never Used   Substance and Sexual Activity    Alcohol use: Never    Drug use: Never    Sexual activity: Yes     Partners: Male     Birth control/protection: Post-menopausal, None       Family History:  Family History   Problem Relation Age of Onset     Diabetes Mother          of Diabetes and other complications.    Hyperlipidemia Mother     Hypertension Mother     Kidney disease Mother     Dementia Mother     Coronary artery disease Mother         MI (cause of death)    Vision loss Mother         Low vision on right side due to cataract surgery complication    Prostate cancer Father         PROSTATE ISSUES    Diverticulitis Father     Anemia Father     Kidney cancer Father     Kidney disease Father         Lost a kidney of cancer    Kidney cancer Sister         KIDNEY    Kidney disease Sister     Asthma Sister         Asthma    Breast cancer Sister 64    Breast cancer Sister         BREAST    Hypertension Sister     Diabetes Sister         Had diabetes, changed her eating habits    Hypertension Sister     Breast cancer Sister         BREAST    Hyperlipidemia Sister     Hypertension Brother     Gout Brother     Hyperlipidemia Brother        Objective  Physical Exam  Physical Exam  Skin: Well-moisturized skin with a small white spot noted.    Gen: Patient in NAD. Pleasant and answers appropriately. A&Ox3.    Skin: Warm and dry with normal turgor. No purpura, rashes, or unusual pigmentation noted. Hair is normal in appearance and distribution.    HEENT: NC/AT. No lesions noted. Conjunctiva clear, sclera nonicteric. PERRL. EOMI without nystagmus or strabismus. Fundi appear benign. No hemorrhages or exudates of eyes. Auditory canals are patent bilaterally without lesions. TMs intact,  nonerythematous, nonbulging without lesions. Nasal mucosa pink, nonerythematous, and nonedematous. Frontal and maxillary sinuses are nontender. O/P nonerythematous and moist without exudate.    Neck: Supple without lymph nodes palpated. FROM.     Lungs: CTA B/L without rales, rhonchi, crackles, or wheezes.    Heart: RRR. S1 and S2 normal. No S3 or S4. No MRGT.    Abd: Soft, nontender,nondistended. (+)BSx4 quadrants.     Extrem: No CCE. Radial pulses 2+/4 and equal B/L. FROMx4. No  bone, joint, or muscle tenderness noted.    Neuro: No focal motor/sensory deficits.      Pelvic Exam/Pap Smear done and pending:  Vulva: Nonerythematous. No lesions or ulcerations. Hair is normal distribution.  Vagina: Nonerythematous. Mucosa pink with rugae. No lesions noted. Normal discharge.  Cervix: Nulliparous. Midline. Closed Os. Nonerythematous,  nonedematous. No CMT on bimanual exam.  Uterus: Normal in size. No masses noted.  Adnexa: No masses palpated. No point of tenderness.      Assessment/Plan  Patient is a 60 y.o.  female who presents to clinic secondary to her well woman's exam.     Diagnoses and all orders for this visit:    1. Encounter for well woman exam with routine gynecological exam (Primary)  -     Cancel: IGP, CtNg, Rfx Aptima HPV ASCU  -     IGP, Aptima HPV, Rfx 16 / 18,45  -     IGP, CtNg, Rfx Aptima HPV ASCU    2. Osteoporosis without current pathological fracture, unspecified osteoporosis type  -     risedronate (ACTONEL) 150 MG tablet; Take 1 tablet by mouth Every 30 (Thirty) Days.  Dispense: 3 tablet; Refill: 1  -     Comprehensive Metabolic Panel; Future    3. Dermatitis  -     tacrolimus (PROTOPIC) 0.1 % ointment; Apply 1 Application topically to the appropriate area as directed 2 (Two) Times a Day.  Dispense: 100 g; Refill: 0  -     Comprehensive Metabolic Panel; Future    4. Seasonal allergic rhinitis, unspecified trigger  -     fluticasone (FLONASE) 50 MCG/ACT nasal spray; Administer 2 sprays into the nostril(s) as directed by provider Daily.  Dispense: 16 g; Refill: 5  -     Comprehensive Metabolic Panel; Future    5. Thyroid nodule  -     T4, Free; Future  -     TSH; Future          Well Woman's Exam  Pelvic exam normal. Pap smear done and pending.  Other STDs including HIV, syphillis, gonorrhea, and chlamydia were not ordered. Discussed findings on pelvic exam extensively with patient, and told her that we would wait for the results.  Mammogram was not ordered. Counseling and  guidance done:  Nutrition, physical activity, healthy weight, injury prevention, misuse of tobacco, alcohol and drugs, sexual behavior and STDs, contraception, dental health, mental health, immunizations breast cancer screening and exams.       Assessment & Plan  1. Osteoporosis.  - Laboratory results are within normal limits, except for a slight elevation in cholesterol levels and a decrease in iron saturation.  - Mammogram results were satisfactory. A DEXA scan was performed.  - Discussion of osteoporosis report from [place] and pending Pap smear.  - Pap smear will be conducted today. Return for blood work in a week, which does not require fasting.    2. Thyroid nodule.  - Thyroid levels were normal.  - Mammogram results were satisfactory.  - Thyroid nodule will be monitored, with a follow-up examination scheduled for March or April of next year.  - Return for blood work in a week to check thyroid levels.    3. Perioral dermatitis.  - Reported using a cream prescribed by a dermatologist in [facility] for perioral dermatitis on her nose and eyes.  - Cream prescribed by dermatologist in [facility] is expensive in the [place].  - Will attempt to write a prescription for the cream to see if it is cheaper.  - Prescription for nasal spray to alleviate allergy symptoms and advised to use regular saline spray.    4. Allergic rhinitis.  - Experiencing significant issues with pollen, causing cold-like symptoms and eye irritation.  - Advised to continue using prescribed eye drops from optometrist.  - Nasal spray will be prescribed to help manage symptoms.  - Advised to use regular saline spray.      BMI is within normal parameters. No other follow-up for BMI required.          Patient or patient representative verbalized consent for the use of Ambient Listening during the visit with  Rosa Pierre MD for chart documentation. 7/14/2025  23:48 EDT        Follow Up   No follow-ups on file.  Findings and plans discussed with patient  who verbalizes understanding and agreement. Will followup with patient once results are in. Patient was given instructions and counseling regarding her condition or for health maintenance advice. Please see specific information pulled into the AVS if appropriate.       Rosa Pierre MD

## 2025-06-30 NOTE — TELEPHONE ENCOUNTER
Please call and let patient know that all she had ever been given was metrogel for her private areas. That was actually for some inflammation in her vagina and is not a permanent medication. We will reassess after her pap results come back.

## 2025-07-02 LAB
CYTOLOGIST CVX/VAG CYTO: NORMAL
CYTOLOGY CVX/VAG DOC CYTO: NORMAL
CYTOLOGY CVX/VAG DOC THIN PREP: NORMAL
DX ICD CODE: NORMAL
HPV GENOTYPE REFLEX: NORMAL
HPV I/H RISK 4 DNA CVX QL PROBE+SIG AMP: NEGATIVE
OTHER STN SPEC: NORMAL
SERVICE CMNT-IMP: NORMAL
STAT OF ADQ CVX/VAG CYTO-IMP: NORMAL

## 2025-07-21 ENCOUNTER — TELEPHONE (OUTPATIENT)
Dept: FAMILY MEDICINE CLINIC | Facility: CLINIC | Age: 60
End: 2025-07-21
Payer: COMMERCIAL

## 2025-07-21 NOTE — TELEPHONE ENCOUNTER
Her pap is negative.  What medication is she talking about? She was sent flonase, actonel, and tacrolimus cream at her last visit. I'm thinking it is the tacrolimus she is talking about. She had talked about a vaginal cream, but we called her [place] pharmacy and they only had had metrogel for her. That's actually for an infection when the vaginal pH changes and is not a chronic medication. (She said she had been on estrogen cream in the past, but we couldn't find anything)

## 2025-07-21 NOTE — TELEPHONE ENCOUNTER
Caller: Yamileth Wiggins    Relationship: Self    Best call back number: [phone]     What is the best time to reach you: ANYTIME    Who are you requesting to speak with (clinical staff, provider,  specific staff member): CLINICAL OR PROVIDER    What was the call regarding: PATIENT WOULD LIKE TO KNOW THE RESULTS OF HER PAP SMEAR AND ALSO, WHAT IS GOING TO BE DONE AND WILL ANY MEDICATION BE SENT IN. AKHIL CALL AND ADVISE.

## 2025-07-21 NOTE — TELEPHONE ENCOUNTER
Her old pharmacy didn't have her on vaginal cream. I would like her to try the tacrolimus one first.

## (undated) DEVICE — ENDOGATOR TUBING FOR ENDOGATOR EGP-100 IRRIGATION PUMP,OLYMPUS OFP PUMP, OLYMPUS AFU-100 PUMP AND ERBE EIP2 PUMP: Brand: ENDOGATOR

## (undated) DEVICE — Device: Brand: DEFENDO AIR/WATER/SUCTION AND BIOPSY VALVE

## (undated) DEVICE — ENDOGATOR AUXILIARY WATER JET CONNECTOR: Brand: ENDOGATOR

## (undated) DEVICE — Device

## (undated) DEVICE — CONN Y IRR DISP 1P/U